# Patient Record
Sex: FEMALE | Race: WHITE | NOT HISPANIC OR LATINO | Employment: OTHER | URBAN - METROPOLITAN AREA
[De-identification: names, ages, dates, MRNs, and addresses within clinical notes are randomized per-mention and may not be internally consistent; named-entity substitution may affect disease eponyms.]

---

## 2022-05-25 ENCOUNTER — OFFICE VISIT (OUTPATIENT)
Dept: OBSTETRICS AND GYNECOLOGY | Facility: CLINIC | Age: 77
End: 2022-05-25

## 2022-05-25 VITALS — DIASTOLIC BLOOD PRESSURE: 92 MMHG | WEIGHT: 163 LBS | HEART RATE: 70 BPM | SYSTOLIC BLOOD PRESSURE: 172 MMHG

## 2022-05-25 DIAGNOSIS — N81.10 CYSTOCELE WITHOUT UTERINE PROLAPSE: Primary | ICD-10-CM

## 2022-05-25 PROCEDURE — 99203 OFFICE O/P NEW LOW 30 MIN: CPT | Performed by: OBSTETRICS & GYNECOLOGY

## 2022-05-25 RX ORDER — LISINOPRIL 2.5 MG/1
2.5 TABLET ORAL NIGHTLY
COMMUNITY
Start: 2022-05-11

## 2022-05-25 RX ORDER — FOLIC ACID 1 MG/1
1 TABLET ORAL DAILY
COMMUNITY

## 2022-05-25 RX ORDER — LEVOTHYROXINE SODIUM 0.05 MG/1
50 TABLET ORAL DAILY
COMMUNITY
Start: 2022-04-05

## 2022-05-25 RX ORDER — MULTIVITAMIN WITH IRON
TABLET ORAL EVERY 24 HOURS
COMMUNITY
End: 2022-10-03

## 2022-05-25 NOTE — ASSESSMENT & PLAN NOTE
States she has had a symptomatic pessary for cystocele since the lase 1990s  She was fitted for a pessary at that time.  She [place] gynecologist in [place] city who did a reconstruction surgery and had to go back to a pessary  Pt would like definitive surgical therapy  She takes pessary in and out herself

## 2022-05-26 NOTE — PROGRESS NOTES
GYN new patient    CC: Bladder prolapse    Tobacco/Nicotine use:  No    HPI:   76 y.o. Contraception or HRT: Post menopausal, s/p HYST BSO, benign indications    Pt has concerns she would like to discuss.  States she has had a symptomatic pessary for cystocele since the   She was fitted for a pessary at that time.  She saw a gynecologist in another city who did a reconstruction surgery and had to go back to a pessary  Pt would like definitive surgical therapy  She takes pessary in and out herself    History: PMHx, Meds, Allergies, PSHx, Social Hx, and POBHx all reviewed and updated.  PCP:Christine Villarreal APRN      Review of Systems     /92   Pulse 70   Wt 73.9 kg (163 lb)     Physical Exam  Vitals and nursing note reviewed. Exam conducted with a chaperone present.   Genitourinary:     General: Normal vulva.      Exam position: Lithotomy position.      Labia:         Right: No lesion.         Left: No lesion.       Urethra: No prolapse or urethral lesion.      Vagina: Prolapsed vaginal walls ( Patient has a Gellhorn #4 in place which was removed without difficulty.  There are no lesions or abrasions.  The past and requested the pessary not be replaced) present. No lesions.         ASSESSMENT AND PLAN:  Problem Visit    Diagnoses and all orders for this visit:    1. Cystocele without uterine prolapse (Primary)  Assessment & Plan:  States she has had a symptomatic pessary for cystocele since the   She was fitted for a pessary at that time.  She saw a gynecologist in another city who did a reconstruction surgery and had to go back to a pessary  Pt would like definitive surgical therapy  She takes pessary in and out herself    Orders:  -     Ambulatory Referral to Gynecologic Urology      Pessary was removed and not replaced              Follow Up:  Return if symptoms worsen or fail to improve.        Monika Allen MD  2022

## 2022-10-03 ENCOUNTER — PRE-ADMISSION TESTING (OUTPATIENT)
Dept: PREADMISSION TESTING | Facility: HOSPITAL | Age: 77
End: 2022-10-03

## 2022-10-03 VITALS
HEIGHT: 60 IN | WEIGHT: 154.9 LBS | BODY MASS INDEX: 30.41 KG/M2 | HEART RATE: 84 BPM | DIASTOLIC BLOOD PRESSURE: 79 MMHG | TEMPERATURE: 97.8 F | RESPIRATION RATE: 18 BRPM | SYSTOLIC BLOOD PRESSURE: 130 MMHG | OXYGEN SATURATION: 97 %

## 2022-10-03 LAB
ANION GAP SERPL CALCULATED.3IONS-SCNC: 12.6 MMOL/L (ref 5–15)
BUN SERPL-MCNC: 14 MG/DL (ref 8–23)
BUN/CREAT SERPL: 13 (ref 7–25)
CALCIUM SPEC-SCNC: 9.4 MG/DL (ref 8.6–10.5)
CHLORIDE SERPL-SCNC: 99 MMOL/L (ref 98–107)
CO2 SERPL-SCNC: 24.4 MMOL/L (ref 22–29)
CREAT SERPL-MCNC: 1.08 MG/DL (ref 0.57–1)
DEPRECATED RDW RBC AUTO: 43.1 FL (ref 37–54)
EGFRCR SERPLBLD CKD-EPI 2021: 52.4 ML/MIN/1.73
ERYTHROCYTE [DISTWIDTH] IN BLOOD BY AUTOMATED COUNT: 14 % (ref 12.3–15.4)
GLUCOSE SERPL-MCNC: 100 MG/DL (ref 65–99)
HCT VFR BLD AUTO: 36.8 % (ref 34–46.6)
HGB BLD-MCNC: 11.9 G/DL (ref 12–15.9)
MCH RBC QN AUTO: 27.4 PG (ref 26.6–33)
MCHC RBC AUTO-ENTMCNC: 32.3 G/DL (ref 31.5–35.7)
MCV RBC AUTO: 84.6 FL (ref 79–97)
PLATELET # BLD AUTO: 398 10*3/MM3 (ref 140–450)
PMV BLD AUTO: 9.1 FL (ref 6–12)
POTASSIUM SERPL-SCNC: 4.1 MMOL/L (ref 3.5–5.2)
RBC # BLD AUTO: 4.35 10*6/MM3 (ref 3.77–5.28)
SODIUM SERPL-SCNC: 136 MMOL/L (ref 136–145)
WBC NRBC COR # BLD: 5.99 10*3/MM3 (ref 3.4–10.8)

## 2022-10-03 PROCEDURE — 36415 COLL VENOUS BLD VENIPUNCTURE: CPT

## 2022-10-03 PROCEDURE — 80048 BASIC METABOLIC PNL TOTAL CA: CPT

## 2022-10-03 PROCEDURE — 85027 COMPLETE CBC AUTOMATED: CPT

## 2022-10-03 RX ORDER — LYSINE 500 MG
1 TABLET ORAL DAILY
COMMUNITY

## 2022-10-03 RX ORDER — MULTIPLE VITAMINS W/ MINERALS TAB 9MG-400MCG
1 TAB ORAL SEE ADMIN INSTRUCTIONS
COMMUNITY

## 2022-10-03 RX ORDER — HYDROCHLOROTHIAZIDE 12.5 MG/1
12.5 CAPSULE, GELATIN COATED ORAL EVERY 24 HOURS
COMMUNITY

## 2022-10-03 RX ORDER — VALACYCLOVIR HYDROCHLORIDE 500 MG/1
1 TABLET, FILM COATED ORAL AS NEEDED
COMMUNITY
Start: 2022-09-30

## 2022-10-03 RX ORDER — SULFAMETHOXAZOLE AND TRIMETHOPRIM 800; 160 MG/1; MG/1
1 TABLET ORAL DAILY
COMMUNITY
Start: 2022-09-30

## 2022-10-03 RX ORDER — GARLIC 180 MG
40 TABLET, DELAYED RELEASE (ENTERIC COATED) ORAL SEE ADMIN INSTRUCTIONS
COMMUNITY

## 2022-10-03 NOTE — DISCHARGE INSTRUCTIONS
Take the following medications the morning of surgery:  LEVOTHYROXINE    ARRIVE FOR SURGERY AT 11:30 AM    If you are on prescription narcotic pain medication to control your pain you may also take that medication the morning of surgery.    General Instructions:  Do not eat solid food after midnight the night before surgery.  You may drink clear liquids day of surgery but must stop at least one hour before your hospital arrival time.  It is beneficial for you to have a clear drink that contains carbohydrates the day of surgery.  We suggest a 12 to 20 ounce bottle of Gatorade or Powerade for non-diabetic patients or a 12 to 20 ounce bottle of G2 or Powerade Zero for diabetic patients. (Pediatric patients, are not advised to drink a 12 to 20 ounce carbohydrate drink)    Clear liquids are liquids you can see through.  Nothing red in color.     Plain water                               Sports drinks  Sodas                                   Gelatin (Jell-O)  Fruit juices without pulp such as white grape juice and apple juice  Popsicles that contain no fruit or yogurt  Tea or coffee (no cream or milk added)  Gatorade / Powerade  G2 / Powerade Zero    Infants may have breast milk up to four hours before surgery.  Infants drinking formula may drink formula up to six hours before surgery.   Patients who avoid smoking, chewing tobacco and alcohol for 4 weeks prior to surgery have a reduced risk of post-operative complications.  Quit smoking as many days before surgery as you can.  Do not smoke, use chewing tobacco or drink alcohol the day of surgery.   If applicable bring your C-PAP/ BI-PAP machine.  Bring any papers given to you in the doctor’s office.  Wear clean comfortable clothes.  Do not wear contact lenses, false eyelashes or make-up.  Bring a case for your glasses.   Bring crutches or walker if applicable.  Remove all piercings.  Leave jewelry and any other valuables at home.  Hair extensions with metal clips must be  removed prior to surgery.  The Pre-Admission Testing nurse will instruct you to bring medications if unable to obtain an accurate list in Pre-Admission Testing.        If you were given a blood bank ID arm band remember to bring it with you the day of surgery.    Preventing a Surgical Site Infection:  For 2 to 3 days before surgery, avoid shaving with a razor because the razor can irritate skin and make it easier to develop an infection.    Any areas of open skin can increase the risk of a post-operative wound infection by allowing bacteria to enter and travel throughout the body.  Notify your surgeon if you have any skin wounds / rashes even if it is not near the expected surgical site.  The area will need assessed to determine if surgery should be delayed until it is healed.  The night prior to surgery shower using a fresh bar of anti-bacterial soap (such as Dial) and clean washcloth.  Sleep in a clean bed with clean clothing.  Do not allow pets to sleep with you.  Shower on the morning of surgery using a fresh bar of anti-bacterial soap (such as Dial) and clean washcloth.  Dry with a clean towel and dress in clean clothing.  Ask your surgeon if you will be receiving antibiotics prior to surgery.  Make sure you, your family, and all healthcare providers clean their hands with soap and water or an alcohol based hand  before caring for you or your wound.    Day of surgery:  Your arrival time is approximately two hours before your scheduled surgery time.  Upon arrival, a Pre-op nurse and Anesthesiologist will review your health history, obtain vital signs, and answer questions you may have.  The only belongings needed at this time will be a list of your home medications and if applicable your C-PAP/BI-PAP machine.  A Pre-op nurse will start an IV and you may receive medication in preparation for surgery, including something to help you relax.     Please be aware that surgery does come with discomfort.  We  want to make every effort to control your discomfort so please discuss any uncontrolled symptoms with your nurse.   Your doctor will most likely have prescribed pain medications.      If you are going home after surgery you will receive individualized written care instructions before being discharged.  A responsible adult must drive you to and from the hospital on the day of your surgery and stay with you for 24 hours.  Discharge prescriptions can be filled by the hospital pharmacy during regular pharmacy hours.  If you are having surgery late in the day/evening your prescription may be e-prescribed to your pharmacy.  Please verify your pharmacy hours or chose a 24 hour pharmacy to avoid not having access to your prescription because your pharmacy has closed for the day.    If you are staying overnight following surgery, you will be transported to your hospital room following the recovery period.  [facility] has all private rooms.    If you have any questions please call Pre-Admission Testing at [phone].  Deductibles and co-payments are collected on the day of service. Please be prepared to pay the required co-pay, deductible or deposit on the day of service as defined by your plan.    Call your surgeon immediately if you experience any of the following symptoms:  Sore Throat  Shortness of Breath or difficulty breathing  Cough  Chills  Body soreness or muscle pain  Headache  Fever  New loss of taste or smell  Do not arrive for your surgery ill.  Your procedure will need to be rescheduled to another time.  You will need to call your physician before the day of surgery to avoid any unnecessary exposure to hospital staff as well as other patients.

## 2022-10-10 NOTE — H&P
Upon arrival patient states she desires to move forward with surgery. Risks, benefits, alternatives discussed and consents confirmed. Questions invited/answered to her apparent satisfaction.

## 2022-10-12 PROBLEM — N81.10 VAGINAL PROLAPSE: Status: ACTIVE | Noted: 2022-10-12

## 2022-10-13 ENCOUNTER — ANESTHESIA EVENT (OUTPATIENT)
Dept: PERIOP | Facility: HOSPITAL | Age: 77
End: 2022-10-13

## 2022-10-13 ENCOUNTER — HOSPITAL ENCOUNTER (OUTPATIENT)
Facility: HOSPITAL | Age: 77
Discharge: HOME | End: 2022-10-13
Attending: OBSTETRICS & GYNECOLOGY | Admitting: OBSTETRICS & GYNECOLOGY

## 2022-10-13 ENCOUNTER — ANESTHESIA (OUTPATIENT)
Dept: PERIOP | Facility: HOSPITAL | Age: 77
End: 2022-10-13

## 2022-10-13 VITALS
SYSTOLIC BLOOD PRESSURE: 119 MMHG | OXYGEN SATURATION: 96 % | WEIGHT: 153.22 LBS | BODY MASS INDEX: 30.08 KG/M2 | DIASTOLIC BLOOD PRESSURE: 84 MMHG | HEART RATE: 61 BPM | RESPIRATION RATE: 20 BRPM | TEMPERATURE: 97.8 F | HEIGHT: 60 IN

## 2022-10-13 DIAGNOSIS — Z98.890 POSTOPERATIVE STATE: Primary | ICD-10-CM

## 2022-10-13 PROBLEM — N81.10 VAGINAL PROLAPSE: Status: RESOLVED | Noted: 2022-10-12 | Resolved: 2022-10-13

## 2022-10-13 PROCEDURE — 25010000002 PROPOFOL 10 MG/ML EMULSION: Performed by: REGISTERED NURSE

## 2022-10-13 PROCEDURE — 25010000002 ONDANSETRON PER 1 MG: Performed by: REGISTERED NURSE

## 2022-10-13 PROCEDURE — 25010000002 HYDROMORPHONE 1 MG/ML SOLUTION: Performed by: REGISTERED NURSE

## 2022-10-13 PROCEDURE — 25010000002 FENTANYL CITRATE (PF) 100 MCG/2ML SOLUTION: Performed by: REGISTERED NURSE

## 2022-10-13 PROCEDURE — 25010000002 DEXAMETHASONE SODIUM PHOSPHATE 20 MG/5ML SOLUTION: Performed by: REGISTERED NURSE

## 2022-10-13 PROCEDURE — 25010000002 CEFAZOLIN IN DEXTROSE 2-4 GM/100ML-% SOLUTION: Performed by: STUDENT IN AN ORGANIZED HEALTH CARE EDUCATION/TRAINING PROGRAM

## 2022-10-13 PROCEDURE — C1771 REP DEV, URINARY, W/SLING: HCPCS | Performed by: OBSTETRICS & GYNECOLOGY

## 2022-10-13 PROCEDURE — 25010000002 MIDAZOLAM PER 1 MG: Performed by: ANESTHESIOLOGY

## 2022-10-13 PROCEDURE — A9270 NON-COVERED ITEM OR SERVICE: HCPCS | Performed by: REGISTERED NURSE

## 2022-10-13 PROCEDURE — C1758 CATHETER, URETERAL: HCPCS | Performed by: OBSTETRICS & GYNECOLOGY

## 2022-10-13 PROCEDURE — 63710000001 PROMETHAZINE PER 25 MG: Performed by: REGISTERED NURSE

## 2022-10-13 PROCEDURE — 25010000002 EPINEPHRINE PER 0.1 MG: Performed by: OBSTETRICS & GYNECOLOGY

## 2022-10-13 DEVICE — TRANSVAGINAL MID-URETHRAL SLING
Type: IMPLANTABLE DEVICE | Site: VAGINA | Status: FUNCTIONAL
Brand: ADVANTAGE FIT™  SYSTEM

## 2022-10-13 RX ORDER — LIDOCAINE HYDROCHLORIDE 10 MG/ML
0.5 INJECTION, SOLUTION EPIDURAL; INFILTRATION; INTRACAUDAL; PERINEURAL ONCE AS NEEDED
Status: DISCONTINUED | OUTPATIENT
Start: 2022-10-13 | End: 2022-10-13 | Stop reason: HOSPADM

## 2022-10-13 RX ORDER — FAMOTIDINE 10 MG/ML
20 INJECTION, SOLUTION INTRAVENOUS ONCE
Status: COMPLETED | OUTPATIENT
Start: 2022-10-13 | End: 2022-10-13

## 2022-10-13 RX ORDER — ONDANSETRON 2 MG/ML
4 INJECTION INTRAMUSCULAR; INTRAVENOUS ONCE AS NEEDED
Status: COMPLETED | OUTPATIENT
Start: 2022-10-13 | End: 2022-10-13

## 2022-10-13 RX ORDER — SODIUM CHLORIDE 0.9 % (FLUSH) 0.9 %
10 SYRINGE (ML) INJECTION EVERY 12 HOURS SCHEDULED
Status: DISCONTINUED | OUTPATIENT
Start: 2022-10-13 | End: 2022-10-13 | Stop reason: HOSPADM

## 2022-10-13 RX ORDER — SODIUM CHLORIDE 0.9 % (FLUSH) 0.9 %
3-10 SYRINGE (ML) INJECTION AS NEEDED
Status: DISCONTINUED | OUTPATIENT
Start: 2022-10-13 | End: 2022-10-13 | Stop reason: HOSPADM

## 2022-10-13 RX ORDER — FLUMAZENIL 0.1 MG/ML
0.2 INJECTION INTRAVENOUS AS NEEDED
Status: DISCONTINUED | OUTPATIENT
Start: 2022-10-13 | End: 2022-10-13 | Stop reason: HOSPADM

## 2022-10-13 RX ORDER — IBUPROFEN 600 MG/1
600 TABLET ORAL ONCE AS NEEDED
Status: DISCONTINUED | OUTPATIENT
Start: 2022-10-13 | End: 2022-10-13 | Stop reason: HOSPADM

## 2022-10-13 RX ORDER — SODIUM CHLORIDE 9 MG/ML
INJECTION, SOLUTION INTRAVENOUS AS NEEDED
Status: DISCONTINUED | OUTPATIENT
Start: 2022-10-13 | End: 2022-10-13 | Stop reason: HOSPADM

## 2022-10-13 RX ORDER — SODIUM CHLORIDE 0.9 % (FLUSH) 0.9 %
10 SYRINGE (ML) INJECTION AS NEEDED
Status: DISCONTINUED | OUTPATIENT
Start: 2022-10-13 | End: 2022-10-13 | Stop reason: HOSPADM

## 2022-10-13 RX ORDER — DIPHENHYDRAMINE HCL 25 MG
25 CAPSULE ORAL
Status: DISCONTINUED | OUTPATIENT
Start: 2022-10-13 | End: 2022-10-13 | Stop reason: HOSPADM

## 2022-10-13 RX ORDER — CEFAZOLIN SODIUM 2 G/100ML
2 INJECTION, SOLUTION INTRAVENOUS ONCE
Status: COMPLETED | OUTPATIENT
Start: 2022-10-13 | End: 2022-10-13

## 2022-10-13 RX ORDER — OXYCODONE HYDROCHLORIDE 5 MG/1
5 TABLET ORAL EVERY 6 HOURS PRN
Qty: 12 TABLET | Refills: 0 | Status: SHIPPED | OUTPATIENT
Start: 2022-10-13 | End: 2022-10-16

## 2022-10-13 RX ORDER — OXYCODONE AND ACETAMINOPHEN 7.5; 325 MG/1; MG/1
1 TABLET ORAL EVERY 4 HOURS PRN
Status: DISCONTINUED | OUTPATIENT
Start: 2022-10-13 | End: 2022-10-13 | Stop reason: HOSPADM

## 2022-10-13 RX ORDER — PROPOFOL 10 MG/ML
VIAL (ML) INTRAVENOUS AS NEEDED
Status: DISCONTINUED | OUTPATIENT
Start: 2022-10-13 | End: 2022-10-13 | Stop reason: SURG

## 2022-10-13 RX ORDER — FENTANYL CITRATE 50 UG/ML
50 INJECTION, SOLUTION INTRAMUSCULAR; INTRAVENOUS
Status: DISCONTINUED | OUTPATIENT
Start: 2022-10-13 | End: 2022-10-13 | Stop reason: HOSPADM

## 2022-10-13 RX ORDER — MIDAZOLAM HYDROCHLORIDE 1 MG/ML
0.5 INJECTION INTRAMUSCULAR; INTRAVENOUS
Status: DISCONTINUED | OUTPATIENT
Start: 2022-10-13 | End: 2022-10-13 | Stop reason: HOSPADM

## 2022-10-13 RX ORDER — PROMETHAZINE HYDROCHLORIDE 25 MG/1
25 SUPPOSITORY RECTAL ONCE AS NEEDED
Status: COMPLETED | OUTPATIENT
Start: 2022-10-13 | End: 2022-10-13

## 2022-10-13 RX ORDER — HYDROCODONE BITARTRATE AND ACETAMINOPHEN 7.5; 325 MG/1; MG/1
1 TABLET ORAL ONCE AS NEEDED
Status: DISCONTINUED | OUTPATIENT
Start: 2022-10-13 | End: 2022-10-13 | Stop reason: HOSPADM

## 2022-10-13 RX ORDER — FENTANYL CITRATE 50 UG/ML
INJECTION, SOLUTION INTRAMUSCULAR; INTRAVENOUS AS NEEDED
Status: DISCONTINUED | OUTPATIENT
Start: 2022-10-13 | End: 2022-10-13 | Stop reason: SURG

## 2022-10-13 RX ORDER — DEXTROSE MONOHYDRATE 25 G/50ML
INJECTION, SOLUTION INTRAVENOUS AS NEEDED
Status: DISCONTINUED | OUTPATIENT
Start: 2022-10-13 | End: 2022-10-13 | Stop reason: HOSPADM

## 2022-10-13 RX ORDER — DIPHENHYDRAMINE HYDROCHLORIDE 50 MG/ML
12.5 INJECTION INTRAMUSCULAR; INTRAVENOUS
Status: DISCONTINUED | OUTPATIENT
Start: 2022-10-13 | End: 2022-10-13 | Stop reason: HOSPADM

## 2022-10-13 RX ORDER — HYDRALAZINE HYDROCHLORIDE 20 MG/ML
5 INJECTION INTRAMUSCULAR; INTRAVENOUS
Status: DISCONTINUED | OUTPATIENT
Start: 2022-10-13 | End: 2022-10-13 | Stop reason: HOSPADM

## 2022-10-13 RX ORDER — SODIUM CHLORIDE, SODIUM LACTATE, POTASSIUM CHLORIDE, CALCIUM CHLORIDE 600; 310; 30; 20 MG/100ML; MG/100ML; MG/100ML; MG/100ML
9 INJECTION, SOLUTION INTRAVENOUS CONTINUOUS
Status: DISCONTINUED | OUTPATIENT
Start: 2022-10-13 | End: 2022-10-13 | Stop reason: HOSPADM

## 2022-10-13 RX ORDER — ONDANSETRON 4 MG/1
4 TABLET, ORALLY DISINTEGRATING ORAL EVERY 8 HOURS PRN
Qty: 6 TABLET | Refills: 0 | Status: SHIPPED | OUTPATIENT
Start: 2022-10-13 | End: 2022-10-15

## 2022-10-13 RX ORDER — MAGNESIUM HYDROXIDE 1200 MG/15ML
LIQUID ORAL AS NEEDED
Status: DISCONTINUED | OUTPATIENT
Start: 2022-10-13 | End: 2022-10-13 | Stop reason: HOSPADM

## 2022-10-13 RX ORDER — SODIUM CHLORIDE 0.9 % (FLUSH) 0.9 %
3 SYRINGE (ML) INJECTION EVERY 12 HOURS SCHEDULED
Status: DISCONTINUED | OUTPATIENT
Start: 2022-10-13 | End: 2022-10-13 | Stop reason: HOSPADM

## 2022-10-13 RX ORDER — NALOXONE HCL 0.4 MG/ML
0.2 VIAL (ML) INJECTION AS NEEDED
Status: DISCONTINUED | OUTPATIENT
Start: 2022-10-13 | End: 2022-10-13 | Stop reason: HOSPADM

## 2022-10-13 RX ORDER — HYDROMORPHONE HYDROCHLORIDE 1 MG/ML
0.5 INJECTION, SOLUTION INTRAMUSCULAR; INTRAVENOUS; SUBCUTANEOUS
Status: DISCONTINUED | OUTPATIENT
Start: 2022-10-13 | End: 2022-10-13 | Stop reason: HOSPADM

## 2022-10-13 RX ORDER — ONDANSETRON 2 MG/ML
INJECTION INTRAMUSCULAR; INTRAVENOUS AS NEEDED
Status: DISCONTINUED | OUTPATIENT
Start: 2022-10-13 | End: 2022-10-13 | Stop reason: SURG

## 2022-10-13 RX ORDER — PROMETHAZINE HYDROCHLORIDE 25 MG/1
25 TABLET ORAL ONCE AS NEEDED
Status: COMPLETED | OUTPATIENT
Start: 2022-10-13 | End: 2022-10-13

## 2022-10-13 RX ORDER — EPHEDRINE SULFATE 50 MG/ML
5 INJECTION, SOLUTION INTRAVENOUS ONCE AS NEEDED
Status: DISCONTINUED | OUTPATIENT
Start: 2022-10-13 | End: 2022-10-13 | Stop reason: HOSPADM

## 2022-10-13 RX ORDER — LIDOCAINE HYDROCHLORIDE 20 MG/ML
INJECTION, SOLUTION EPIDURAL; INFILTRATION; INTRACAUDAL; PERINEURAL AS NEEDED
Status: DISCONTINUED | OUTPATIENT
Start: 2022-10-13 | End: 2022-10-13 | Stop reason: SURG

## 2022-10-13 RX ORDER — LABETALOL HYDROCHLORIDE 5 MG/ML
5 INJECTION, SOLUTION INTRAVENOUS
Status: DISCONTINUED | OUTPATIENT
Start: 2022-10-13 | End: 2022-10-13 | Stop reason: HOSPADM

## 2022-10-13 RX ORDER — DEXAMETHASONE SODIUM PHOSPHATE 4 MG/ML
INJECTION, SOLUTION INTRA-ARTICULAR; INTRALESIONAL; INTRAMUSCULAR; INTRAVENOUS; SOFT TISSUE AS NEEDED
Status: DISCONTINUED | OUTPATIENT
Start: 2022-10-13 | End: 2022-10-13 | Stop reason: SURG

## 2022-10-13 RX ORDER — EPHEDRINE SULFATE 50 MG/ML
INJECTION INTRAVENOUS AS NEEDED
Status: DISCONTINUED | OUTPATIENT
Start: 2022-10-13 | End: 2022-10-13 | Stop reason: SURG

## 2022-10-13 RX ADMIN — HYDROMORPHONE HYDROCHLORIDE 0.5 MG: 1 INJECTION, SOLUTION INTRAMUSCULAR; INTRAVENOUS; SUBCUTANEOUS at 13:20

## 2022-10-13 RX ADMIN — ONDANSETRON 4 MG: 2 INJECTION INTRAMUSCULAR; INTRAVENOUS at 13:20

## 2022-10-13 RX ADMIN — FENTANYL CITRATE 25 MCG: 50 INJECTION, SOLUTION INTRAMUSCULAR; INTRAVENOUS at 15:07

## 2022-10-13 RX ADMIN — EPHEDRINE SULFATE 10 MG: 50 INJECTION INTRAVENOUS at 13:33

## 2022-10-13 RX ADMIN — PROMETHAZINE HYDROCHLORIDE 25 MG: 25 TABLET ORAL at 17:47

## 2022-10-13 RX ADMIN — PROPOFOL 200 MG: 10 INJECTION, EMULSION INTRAVENOUS at 13:07

## 2022-10-13 RX ADMIN — EPHEDRINE SULFATE 10 MG: 50 INJECTION INTRAVENOUS at 13:49

## 2022-10-13 RX ADMIN — DEXAMETHASONE SODIUM PHOSPHATE 10 MG: 4 INJECTION, SOLUTION INTRAMUSCULAR; INTRAVENOUS at 13:20

## 2022-10-13 RX ADMIN — ONDANSETRON 4 MG: 2 INJECTION INTRAMUSCULAR; INTRAVENOUS at 17:07

## 2022-10-13 RX ADMIN — SODIUM CHLORIDE, POTASSIUM CHLORIDE, SODIUM LACTATE AND CALCIUM CHLORIDE 9 ML/HR: 600; 310; 30; 20 INJECTION, SOLUTION INTRAVENOUS at 10:56

## 2022-10-13 RX ADMIN — FAMOTIDINE 20 MG: 10 INJECTION INTRAVENOUS at 10:55

## 2022-10-13 RX ADMIN — FENTANYL CITRATE 25 MCG: 50 INJECTION, SOLUTION INTRAMUSCULAR; INTRAVENOUS at 14:07

## 2022-10-13 RX ADMIN — CEFAZOLIN SODIUM 2 G: 2 INJECTION, SOLUTION INTRAVENOUS at 12:50

## 2022-10-13 RX ADMIN — MIDAZOLAM 0.5 MG: 1 INJECTION, SOLUTION INTRAMUSCULAR; INTRAVENOUS at 12:42

## 2022-10-13 RX ADMIN — EPHEDRINE SULFATE 10 MG: 50 INJECTION INTRAVENOUS at 15:31

## 2022-10-13 RX ADMIN — Medication 100 MG: at 13:07

## 2022-10-13 NOTE — ANESTHESIA PROCEDURE NOTES
Airway  Urgency: elective      General Information and Staff    Patient location during procedure: OR  CRNA/CAA: Aldo Devi CRNA    Indications and Patient Condition  Indications for airway management: airway protection    Preoxygenated: yes  MILS not maintained throughout  Mask difficulty assessment: 1 - vent by mask    Final Airway Details  Final airway type: supraglottic airway      Successful airway: LMA  Size 4     Number of attempts at approach: 1  Assessment: lips, teeth, and gum same as pre-op and atraumatic intubation

## 2022-10-13 NOTE — OP NOTE
OPERATIVE REPORT      Surgery Date:  10/13/2022     Preoperative Diagnosis:   1. Stage 3 vaginal vault prolapse  2. Stress urinary incontinence     Postoperative Diagnosis:   Same     Surgery:   1. Total colpectomy   2. Retropubic midurethral sling  3. Cystoscopy      Attending:  Dr. Daryl Kumar MD  Fellow: Dr. Lewis Metz DO  Resident: Salma Frey MD    Anesthesia: GETA     Antibiotics: 2g Ancef     EBL: 50 mL     IVF:  Per anesthesia record      Urine output:  Not assessed      Complications:  None     Implants: None     Specimens:  None     Intraoperative Findings:  Normal findings noted on cystoscopy with bilateral ureteral efflux noted.        Description of Procedure:       Consents were reviewed in the preoperative area and the preoperative antibiotics were given after the risks associated with the procedure were discussed with the patient (which include bleeding, infection, failure of the procedure and need for repeat surgery in the future, as well as injury to surrounding tissue had been discussed and consents confirmed. The patient was taken to the operating room, where sequential compression boots were placed and pumping prior to the induction of anesthesia. The patient was placed under general endotracheal anesthesia and placed in dorsal lithotomy position in stirrups. A time out was performed and the patient was prepped and draped in a sterile fashion. A Griselda hugger was placed to maintain core body temperature. A Reynaga catheter was inserted into the bladder.      Suprapubic areas of planned sling exit were marked 2 finger breadths lateral to the mildine bilaterally immediately superior to the pubic bone. The space of Retzius was infiltrated using the epinephrine solution 0.625 mg in 500 mL of normal saline (60 mL on each side). Attention was return to the anterior vagina. Two Allis' were placed at 1 cm proximal to the urethral meatus and at the urethro-vesicular junction framing the mid-urethra.  Both lateral sulci were injected with epinephrine solution using approximately 60 ml on each side and a 1 cm midline vertical incision was made in the vaginal mucosa over the mid-urethra with a #15 blade scalpel. The Metzenbaum scissors were then used to dissect the vaginal mucosa off the underlying endopelvic fascia, toward the pubic rami until the pubic bone was reached thus creating a tunnel. The was done on both sides. The Acosta catheter was inserted to drain the bladder. The risk of injury to the urethra and bladder was minimized by placing a rigid guide inside the Acosta catheter to position the urethra and bladder neck at a 90-degree angle to the path of the insertion of the trocar.     The Advantage fit sling trocar was inserted through the suburethral incision into the right tunnel, guiding it under the inferior margin of the pubic symphysis and immediately upward through the retropubic space in close contact with the posterior surface of the pubic bone in line with the ipsilateral shoulder. The trocar was advanced until it penetrated the marked site in the suprapubic area on the patient's right side. The same procedure was repeated on the left side. The acosta catheter was removed. A cystoscopy was performed using a 70-degree lens to carefully inspect the bladder mucosa for evidence of needle perforation. There was no evidence of bladder perforation, the cystoscope was withdrawn and bladder emptied. The vaginal sulci were checked to ensure no injury to the vaginal mucosa. The tape was pulled upward through the suprapubic incisions using a Karey clamp against the urethra to avoid over-tensioning. The midline blue tab was cut and the mesh was lifted up to confirm tension on the mesh. The plastic sheaths were then lifted and removed from over the mesh, with continuing positioning of the Karey clamp against the urethra to avoid over-tensioning. The mesh over the urethra was seen to be laying flat and loose  enough not to exert pressure on urethra at rest. Each end of the tape was then cut just below the skin level by depressing the skin around the suprapubic incisions while holding the tape. The vaginal incision was irrigated with sterile fluid and wound was found to be hemostatic. A deep mattress using 2.O Vicryl suture was placed over the in the vaginal mucosa overlying the mesh. The vaginal mucosa was closed in an interrupted fashion using 2.O Vicryl suture. Finally, the skin insions were closed using 4.0 Monocryl in an interrupted subcuticular fashion. The acosta catheter was replaced in the bladder.      Attention was then turned to the vaginal apex where 2 Allis clamps were placed. We then turned attention to the anterior vaginal wall and the perineum which which was injected with a dilute epinephrine solution.  The scalpel was used to incise a square piece of epithelium over the anterior vagina.  This was excised with the metzenbaum scissors with the assistance of allis clamps and pickups with teeth. The posterior vagina was injected with dilute epinephrine solution.  An inverted triangle was made with the scalpel excising the perineum and extending to the vagina. The posterior vagina was excised for near the entirety of the vaginal wall.  We then used 0 Vicryl sutures using multiple sutures sewing from the most anterior portion of the incision just inferior to the urethra and extending down to the most distal portion near the perineal body, approximating the anterior to the posterior vagina when these were tied. Several of these were done, tagged, and eventually tied approximating anterior to the posterior vagina. We then used the same sutures to approximate laterally the levator muscles to the top wall. Most distally, the incision on the perineum was widened to severely narrow the vagina and #1 Vicryl sutures were used to approximate the levator muscles in the midline with several interrupted sutures, then used  a 0 Vicryl suture to approximate the left to the right portion of the vagina. Finally, the mucosal edges that were not together were approximated with 0 Vicryl sutures and the perineum was closed in a similar fashion to our episiotomy closure using a 3-0 Vicryl suture in a running subcutaneous fashion. At the conclusion, there was excellent hemostasis.     A cystoscopy was performed using a 70-degree lens to carefully inspect the bladder mucosa for evidence of ureteral obstruction. There were bilateral efflux from the ureters.  The acosta catheter was replaced in the bladder.     All sponge, needle, and instrument counts were noted to be correct x2 at the end of the procedure. The patient tolerated the procedure well. She was extubated and transferred to the recovery room in stable condition. Dr. Daryl Kumar was present and participated in all portions of the procedure.

## 2022-10-13 NOTE — ANESTHESIA PREPROCEDURE EVALUATION
Anesthesia Evaluation     Patient summary reviewed and Nursing notes reviewed   history of anesthetic complications: PONV               Airway   Mallampati: II  No difficulty expected  Dental    (+) edentulous    Pulmonary     breath sounds clear to auscultation  (+) shortness of breath,   Cardiovascular     Rhythm: regular    (+) hypertension,       Neuro/Psych  (+) psychiatric history Anxiety,    GI/Hepatic/Renal/Endo    (+)   thyroid problem     Musculoskeletal     Abdominal   (+) obese,    Substance History      OB/GYN          Other   arthritis,                      Anesthesia Plan    ASA 2     general     intravenous induction     Anesthetic plan, risks, benefits, and alternatives have been provided, discussed and informed consent has been obtained with: patient.        CODE STATUS:

## 2022-10-13 NOTE — NURSING NOTE
"Spoke with Dr. Metz, resident for Dr. Kumar. Told her patient states she \"feels so weak\", patient has been persistently nauseated in PACU, has vomited x2. Medicated with Zofran and Phenergan with little improvement. Pt remains drowsy but easily arousable. Dr. Metz advised to monitor patient for another 45 minutes and then re-evaluate.   "

## 2022-10-13 NOTE — ANESTHESIA POSTPROCEDURE EVALUATION
"Patient: Brook Johansen    Procedure Summary     Date: 10/13/22 Room / Location: [facility] OR [address] [place] [address] MAIN OR    Anesthesia Start: 1257 Anesthesia Stop: 1554    Procedures:       COLPOCLEISIS (Vagina)      SLING, CYSTOSCOPY (Vagina) Diagnosis:     Surgeons: Daryl Kumar MD Provider: Deyanira Taylor MD    Anesthesia Type: general ASA Status: 2          Anesthesia Type: general    Vitals  Vitals Value Taken Time   /67 10/13/22 1931   Temp 37 °C (98.6 °F) 10/13/22 1551   Pulse 63 10/13/22 1938   Resp 18 10/13/22 1930   SpO2 92 % 10/13/22 1938   Vitals shown include unvalidated device data.        Post Anesthesia Care and Evaluation    Patient location during evaluation: bedside  Patient participation: complete - patient participated  Level of consciousness: awake and alert  Pain management: adequate    Airway patency: patent  Anesthetic complications: No anesthetic complications    Cardiovascular status: acceptable  Respiratory status: acceptable  Hydration status: acceptable    Comments: /67   Pulse 73   Temp 37 °C (98.6 °F) (Oral)   Resp 18   Ht 152.4 cm (60\")   Wt 69.5 kg (153 lb 3.5 oz)   SpO2 95%   BMI 29.92 kg/m²       "

## 2022-10-13 NOTE — DISCHARGE INSTRUCTIONS
Female Pelvic Medicine & Reconstructive Surgery (Urogynecology)     POST OPERATIVE INSTRUCTIONS & RECOVERY    You may have had reconstructive pelvic surgery and it is now your turn to play an important role in its success. We know that it will take about 12 weeks for the tissues that have been operated on to heal to 80% of their final strength. Then it may take months or even up to two years for this healing to be complete. Major risk factors for tearing down the surgical repair in the first weeks or months are constipation and heavy lifting. Our recommendations below are to try to prevent this from happening.    The staff at [facility] is committed to ensuring that your post-operative experience is as comfortable as possible. Please do not hesitate to call the office for any questions after recovery. Any questions regarding your surgery or post-operative recovery should be directed to the staff at [facility] rather than your Primary Care Physician (PCP). The following information will help answer the frequently asked questions and will help you understand some of the common experiences that may occur after your surgery.    What should I expect immediately after surgery?    Activity  Take it easy for the first few weeks after surgery; you may need assistance the first few days. Some people notice slight depression after surgery. This will resolve on its own, but please call us if it does not.  You may walk as much as you would like and go up and down stairs, although in the beginning you may need to take one step at a time. Avoid jogging, aerobics, swimming, and biking. Avoid heavy pushing or pulling, including vacuuming and lifting pets/children/grandchildren.  You can return to work 4 to 6 weeks after surgery depending on your requirements for lifting and standing.   Heavy lifting is a major risk factor for tearing down your repair. You should not lift anything heavier than a gallon of milk (8 to  9 pounds) for the first six weeks. You may gradually increase your lifting to 20 pounds six to twelve weeks after surgery.  You may drive your car as soon as you feel ready and can safely react/turn your body, but not while you are taking narcotic medication.    Nutrition  Resume your normal diet after surgery. Eat a well-balanced diet. Drink six to eight glasses of non-caffeinated beverages daily. Water is especially recommended.   Resume all of your prior medications once you are home unless otherwise instructed by your doctor.     Abdominal incision care  If you have an abdominal incision, clean the incision with water. Incisions for laparoscopy are usually ¼ to ½ inch in size. You may have 2 to 3 small incisions with one larger ¾ inch incision OR you may only have 3 to 4 small incisions.   The incisions will have absorbent sutures, steri-strips, band-aids and/or skin glue. The band-aids may be removed the day after surgery. The steri-strips can be kept on until they curl at the edges. The sutures will absorb by themselves. If your skin is closed with staples they must be removed 7-10 days after surgery. Call the office to schedule an appointment for this procedure.    Hygiene, Abdominal incision care, and Vaginal discharge  Shower as usual. If you have an abdominal incision, clean the incision with water. Do not bathe in the bathtub until you have been given permission, usually after you see your surgeon at your 6-week post-op appointment. Keep the incision area clean and dry. Do not use a dressing unless your incision is draining or irritated.  You may notice an abnormal discharge or drainage. It is normal for discharge to go from red to pink to yellow as your sutures dissolve. If the discharge is foul-smelling please call our office.  Keep the perineal area (between the vagina and the rectum) clean and dry. Clean after every bowel movement and each time you urinate. Rinsing with plain water or a sitz-bath may  be helpful.  Use maxi pads - not tampons - to absorb blood or discharge. Avoid douching. You may notice vaginal spotting for up to 6 weeks. If you have bleeding that is heavy enough to soak through two maxi pads for two hours in a row call our office. Keep track of when the bleeding began and how many pads you have used.    Bowel Movements  Constipation can cause severe pain that can get worse with increased amounts of medication. Narcotics can make you constipated. Although we recognize that, depending on the extent of your surgery you may need to use narcotics, please be aware that they can cause constipation.  Constipation is also a major risk factor in tearing down of the surgical repair. We want to avoid constipation. Preventing constipation is much easier than treating it once it happens.  Do not strain during bowel movements.  If you experience constipation, make sure you are drinking plenty of water and eating a high fiber diet. A high fiber diet includes fruits, vegetables, and bran. Your doctor may have also recommended a fiber supplement like Benefiber, Metamucil, Citrucel, or Fibercon. Take this as directed once you are home from the hospital.  If you commonly experience constipation,  over-the-counter MiraLAX (you can use a generic version of polyethylene glycol as well). Please take this daily. If your stool gets too loose while using MiraLAX you can decrease using the medication every other day or every third day. Adjust the MiraLAX dose according to what your need for a regular bowel movement without straining.  If you start to feel constipated you can take MiraLAX once in the morning and once at night until you have your first bowel movement. Alternatively, you may also use a mild laxative such as Milk of Magnesia or a stool softener such as Colace®. In addition, make sure that you are getting enough activity during the day. No prescription is required for these medications.     Voiding /  Urination  Empty your bladder every two to three hours while you are awake, whether you feel like you need to or not. Sometimes surgery can change your sensation of fullness and you may not feel like you need to void. We want to avoid your bladder becoming too full.  If you have any special bladder instructions, you will be given them in the hospital. If you are unsure about these instructions call the office.  Call the office if you begin to experience any bladder problems. These problems may include urgency, frequency or pain with urination.     Sexual [place]  You should not have intercourse for a minimum of 6 weeks after surgery. Your doctor will tell you when you can resume sexual intercourse at your post-op visit.  You should not put anything in the vagina for six weeks after surgery. The only exceptions are if your doctor told you to use vaginal estrogen cream or pills. If you are using vaginal estrogen cream or pills, you can resume using those when you get home.    Fever  If you feel feverish or having shaking chills, take your temperature.  If your temperature is 100.4°F twice in a row, or if your temperature is 101°F or higher one time, call our office. This may mean that you have an infection and we will want to know about that.    Pain control  Most women will require pain medication after surgery. If you are comfortable you will be a little more active, which will help recovery.  Follow your doctor's instructions regarding pain medication. Because the narcotics can cause constipation, we encourage you to take over the counter medications like Tylenol (generic name: acetaminophen) or ibuprofen when you are able. If you are unsure if you are able to take these medications then ask your doctor about using them before your use it. Make sure that you do not take more than the recommended daily limit.   You may use a heating pad on your abdomen to relieve gas pain.    Please notify our office if any of  the following occurs:    Increased pain, redness, discharge, hardness, or swelling at the incision.  Pain or burning with urination  Vaginal bleeding that soaks more than 1 maxi pad per hour for two hours in a row  Fever greater than 101°F   Persistent nausea, vomiting or inability to have bowel movements.  Shortness of breath, calf pain or swelling in your extremities    Please call the office to schedule your post-operative visit for about 6 weeks after your surgery. You may also see your doctor again about 3 to 6 months and/or 1 year after your surgery. Additional appointments can and will be made based upon need.     **If you leave the hospital with a bladder catheter in place call the office to schedule an appointment to take place 1 week after your surgery **    Please call with any questions or concerns.   [phone] Monday to Friday 8AM to 5PM  or [phone] after work hours and during holidays    Dr. Daryl Metz

## 2022-10-14 NOTE — NURSING NOTE
Updated dr wooten of pt void trial- 3oomL sterile water inserted in acosta and acosta d/c'd. 30 min later pt voided 250mL and bladder scanned three times each read 0mL. Ok to discharge home per dr wooten. Pt and daniella- pt , both stated they do not want to  prescriptions tonight and will  at home pharmacy in AM. Pt denies pain and just wants rest, denies nausea also.

## 2022-10-14 NOTE — NURSING NOTE
"Updated dr wooten, pt states her nausea is \"much better\" and denies all pain, pt just feels a \"little tired\", asleep arousable to voice and axox4, VSS on room air. Ok to proceed with voiding trial per order and will update dr wooten with results of voiding trial.   "

## (undated) DEVICE — SUT VIC 1 CT1 36IN J947H

## (undated) DEVICE — ANTIBACTERIAL UNDYED BRAIDED (POLYGLACTIN 910), SYNTHETIC ABSORBABLE SUTURE: Brand: COATED VICRYL

## (undated) DEVICE — SYRINGE, LUER LOCK, 60ML: Brand: MEDLINE

## (undated) DEVICE — CATHETER,URETHRAL,REDRUBBER,STRL,16FR: Brand: MEDLINE

## (undated) DEVICE — COVER,TABLE,44X90,STERILE: Brand: MEDLINE

## (undated) DEVICE — SKIN PREP TRAY W/CHG: Brand: MEDLINE INDUSTRIES, INC.

## (undated) DEVICE — PK HYST VAG 40

## (undated) DEVICE — SOL NS 500ML

## (undated) DEVICE — GLV SURG PREMIERPRO ORTHO LTX PF SZ8 BRN

## (undated) DEVICE — DRAPE,UNDERBUTTOCKS,PCH,STERILE: Brand: MEDLINE

## (undated) DEVICE — SUT VIC 2/0 CT2 27IN J269H

## (undated) DEVICE — IRRIGATOR BULB ASEPTO 60CC STRL

## (undated) DEVICE — CATH URETRL FLXITP POLLACK STD 5F 70CM

## (undated) DEVICE — SYR CONTRL PRESS/LO FIX/M/LL W/THMB/RNG 10ML

## (undated) DEVICE — NEEDLE, QUINCKE, 18GX3.5": Brand: MEDLINE

## (undated) DEVICE — DECANTER BAG 9": Brand: MEDLINE INDUSTRIES, INC.

## (undated) DEVICE — SUT MNCRYL PLS ANTIB UD 4/0 PS2 18IN

## (undated) DEVICE — INTENDED FOR TISSUE SEPARATION, AND OTHER PROCEDURES THAT REQUIRE A SHARP SURGICAL BLADE TO PUNCTURE OR CUT.: Brand: BARD-PARKER ® CARBON RIB-BACK BLADES